# Patient Record
Sex: FEMALE | Race: WHITE
[De-identification: names, ages, dates, MRNs, and addresses within clinical notes are randomized per-mention and may not be internally consistent; named-entity substitution may affect disease eponyms.]

---

## 2017-05-05 ENCOUNTER — HOSPITAL ENCOUNTER (EMERGENCY)
Dept: HOSPITAL 61 - ER | Age: 37
Discharge: HOME | End: 2017-05-05
Payer: SELF-PAY

## 2017-05-05 VITALS — BODY MASS INDEX: 48.36 KG/M2 | HEIGHT: 64 IN | WEIGHT: 283.25 LBS

## 2017-05-05 VITALS — DIASTOLIC BLOOD PRESSURE: 111 MMHG | SYSTOLIC BLOOD PRESSURE: 210 MMHG

## 2017-05-05 DIAGNOSIS — S29.011A: Primary | ICD-10-CM
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
## 2017-05-05 DIAGNOSIS — I10: ICD-10-CM

## 2017-05-05 PROCEDURE — 99283 EMERGENCY DEPT VISIT LOW MDM: CPT

## 2017-05-05 NOTE — PHYS DOC
Past Medical History


Past Medical History:  No Pertinent History


Past Surgical History:  , Other


Additional Past Surgical Histo:  esophageal nos


Alcohol Use:  None


Drug Use:  None





Adult General


Chief Complaint


Chief Complaint:  BREAST PROBLEM





[facility]


HPI





Patient is a 37  year old female presents emergency Department with complaint 

of left chest wall pain for approximately 3 days. Patient states that she was 

wrestling with her cousin when she felt some pulling her left chest. She denies 

shortness of breath, radiation of the pain, nausea or vomiting.


Of incidental note, patient does have a history of hypertension. She states 

that she has not taken her blood pressure medicine in quite some time. She 

denies palpitations, exertional dyspnea, orthopnea or PND. She denies headache, 

visual disturbances or tinnitus. She denies any focal weakness, numbness or 

tingling.





Review of Systems


Review of Systems





Constitutional: Denies fever or chills []


Eyes: Denies change in visual acuity, redness, or eye pain []


HENT: Denies nasal congestion or sore throat []


Respiratory: Denies cough or shortness of breath []


Cardiovascular: No additional information not addressed in HPI []


GI: Denies abdominal pain, nausea, vomiting, bloody stools or diarrhea []


: Denies dysuria or hematuria []


Musculoskeletal: Denies back pain or joint pain []


Integument: Denies rash or skin lesions []


Neurologic: Denies headache, focal weakness or sensory changes []


Endocrine: Denies polyuria or polydipsia []





Allergies


Allergies





Allergies








Coded Allergies Type Severity Reaction Last Updated Verified


 


  No Known Drug Allergies    14 No











Physical Exam


Physical Exam





Constitutional: Well developed, well nourished, no acute distress, non-toxic 

appearance. []


HENT: Normocephalic, atraumatic, bilateral external ears normal, oropharynx 

moist, no oral exudates, nose normal. []


Eyes: PERRLA, EOMI, conjunctiva normal, no discharge. Patient has strabismus 

worse glasses. 


Neck: Normal range of motion, no tenderness, supple, no stridor. [] 


Cardiovascular:Heart rate regular rhythm, no murmur []


Lungs & Thorax: There is no evidence respiratory distress respiratory fatigue. 

There is no posturing or sensory muscle use. Lung sounds are clear to 

auscultation bilaterally. There is tenderness to palpation to the middle 

portion of the pectoralis muscle without palpable defect, deformity or spasm. 

There is no mass to the breast tissue amenable discharge or axillary 

lymphadenopathy.


Abdomen: Bowel sounds normal, soft, no tenderness, no masses, no pulsatile 

masses. [] 


Skin: Warm, dry, no erythema, no rash. [] 


Back: No tenderness, no CVA tenderness. [] 


Extremities: No tenderness, no cyanosis, no clubbing, ROM intact, no edema. [] 


Neurologic: Alert and oriented X 3, normal motor function, normal sensory 

function, no focal deficits noted. []


Psychologic: Affect normal, judgement normal, mood normal. []





EKG


EKG


[]





Radiology/Procedures


Radiology/Procedures


[]





Course & Med Decision Making


Course & Med Decision Making


Pertinent Labs and Imaging studies reviewed. (See chart for details)





[]





Dragon Disclaimer


Dragon Disclaimer


This electronic medical record was generated, in whole or in part, using a 

voice recognition dictation system.





Departure


Departure


Impression:  


 Primary Impression:  


 Strain of left pectoralis muscle


 Additional Impression:  


 Hypertension


Disposition:  01 HOME, SELF-CARE


Condition:  GOOD


Referrals:  


NON,STAFF (PCP)


Patient Instructions:  Hypertension, Easy-to-Read, Muscle Strain, Easy-to-Read





Additional Instructions:  


1. Take the medication as prescribed.


2. Review the discharge instructions provided for self-care and reasons to 

return to the emergency department.


3. It is very important for you to follow up with [facility] or another 

healthcare provider to address your blood pressure. It is obvious that you need 

to be placed back on blood pressure medication and take it daily.


Scripts


Hydrocodone/Apap 5-325 (NORCO 5-325 TABLET) 1 Each Tablet


1 TAB PO PRN Q6HRS Y for PAIN, #10 TAB 0 Refills


   Prov: ORLANDO CHAMPAGNE         17





Problem Qualifiers








 Primary Impression:  


 Strain of left pectoralis muscle


 Encounter type:  initial encounter  Qualified Codes:  S29.011A - Strain of 

muscle and tendon of front wall of thorax, initial encounter


 Additional Impression:  


 Hypertension


 Hypertension type:  essential hypertension  Qualified Codes:  I10 - Essential (

primary) hypertension








ORLANDO CHAMPAGNE May 5, 2017 18:44

## 2018-07-27 ENCOUNTER — HOSPITAL ENCOUNTER (EMERGENCY)
Dept: HOSPITAL 61 - ER | Age: 38
Discharge: HOME | End: 2018-07-27
Payer: SELF-PAY

## 2018-07-27 DIAGNOSIS — Z3A.10: ICD-10-CM

## 2018-07-27 DIAGNOSIS — E86.0: ICD-10-CM

## 2018-07-27 DIAGNOSIS — O23.41: Primary | ICD-10-CM

## 2018-07-27 DIAGNOSIS — O10.911: ICD-10-CM

## 2018-07-27 LAB
ADD MAN DIFF?: NO
AMORPHOUS SEDIMENT,UR: PRESENT /HPF
ANION GAP SERPL CALC-SCNC: 13 MMOL/L (ref 6–14)
ANISOCYTOSIS: SLIGHT
BACTERIA,URINE: (no result) /HPF
BASO #: 0.1 X10^3/UL (ref 0–0.2)
BASO %: 1 % (ref 0–3)
BILIRUBIN,URINE: (no result)
BLOOD UREA NITROGEN: 10 MG/DL (ref 7–20)
CALCIUM: 9.5 MG/DL (ref 8.5–10.1)
CHLORIDE: 104 MMOL/L (ref 98–107)
CLARITY,URINE: (no result)
CO2 SERPL-SCNC: 25 MMOL/L (ref 21–32)
COLOR,URINE: (no result)
CREAT SERPL-MCNC: 1.1 MG/DL (ref 0.6–1)
EOS #: 0.3 X10^3/UL (ref 0–0.7)
EOS %: 3 % (ref 0–3)
GFR SERPLBLD BASED ON 1.73 SQ M-ARVRAT: 55.9 ML/MIN
GLUCOSE SERPL-MCNC: 129 MG/DL (ref 70–99)
GLUCOSE,URINE: NEGATIVE MG/DL
HCG SERPL-ACNC: 9.9 X10^3/UL (ref 4–11)
HEMATOCRIT: 34.3 % (ref 36–47)
HEMOGLOBIN: 10.7 G/DL (ref 12–15.5)
HYPOCHROMIA: (no result)
LYMPH #: 2.6 X10^3/UL (ref 1–4.8)
LYMPH %: 26 % (ref 24–48)
MEAN CORPUSCULAR HEMOGLOBIN: 20 PG (ref 25–35)
MEAN CORPUSCULAR HGB CONC: 31 G/DL (ref 31–37)
MEAN CORPUSCULAR VOLUME: 64 FL (ref 79–100)
MICROCYTOSIS: (no result)
MONO #: 0.6 X10^3/UL (ref 0–1.1)
MONO %: 6 % (ref 0–9)
NEUT #: 6.3 X10^3UL (ref 1.8–7.7)
NEUT %: 64 % (ref 31–73)
NITRITE,URINE: NEGATIVE
OVALOCYTES: (no result)
PH,URINE: 5.5
PLATELET COUNT: 395 X10^3/UL (ref 140–400)
PLT ESTIMATE: ADEQUATE
POTASSIUM SERPL-SCNC: 3.3 MMOL/L (ref 3.5–5.1)
PROTEIN,URINE: 30 MG/DL
RBC,URINE: 0 /HPF (ref 0–2)
RED BLOOD COUNT: 5.37 X10^6/UL (ref 3.5–5.4)
RED CELL DISTRIBUTION WIDTH: 19.5 % (ref 11.5–14.5)
SODIUM: 142 MMOL/L (ref 136–145)
SPECIFIC GRAVITY,URINE: >=1.03
SQUAMOUS EPITHELIAL CELL,UR: (no result) /LPF
TEAR DROP CELLS: (no result)
UROBILINOGEN,URINE: 1 MG/DL
WBC,URINE: (no result) /HPF (ref 0–4)

## 2018-07-27 PROCEDURE — 84702 CHORIONIC GONADOTROPIN TEST: CPT

## 2018-07-27 PROCEDURE — 99284 EMERGENCY DEPT VISIT MOD MDM: CPT

## 2018-07-27 PROCEDURE — 80048 BASIC METABOLIC PNL TOTAL CA: CPT

## 2018-07-27 PROCEDURE — 85025 COMPLETE CBC W/AUTO DIFF WBC: CPT

## 2018-07-27 PROCEDURE — 96374 THER/PROPH/DIAG INJ IV PUSH: CPT

## 2018-07-27 PROCEDURE — 96361 HYDRATE IV INFUSION ADD-ON: CPT

## 2018-07-27 PROCEDURE — 36415 COLL VENOUS BLD VENIPUNCTURE: CPT

## 2018-07-27 PROCEDURE — 81001 URINALYSIS AUTO W/SCOPE: CPT

## 2018-07-27 RX ADMIN — BACITRACIN 1 MLS/HR: 5000 INJECTION, POWDER, FOR SOLUTION INTRAMUSCULAR at 19:45

## 2018-07-27 RX ADMIN — BACITRACIN 1 MLS/HR: 5000 INJECTION, POWDER, FOR SOLUTION INTRAMUSCULAR at 18:18

## 2018-07-27 RX ADMIN — ONDANSETRON 1 MG: 2 INJECTION INTRAMUSCULAR; INTRAVENOUS at 18:19

## 2020-02-25 VITALS — SYSTOLIC BLOOD PRESSURE: 139 MMHG | DIASTOLIC BLOOD PRESSURE: 61 MMHG

## 2021-06-09 ENCOUNTER — HOSPITAL ENCOUNTER (OUTPATIENT)
Dept: HOSPITAL 61 - RAD | Age: 41
End: 2021-06-09
Payer: COMMERCIAL

## 2021-06-09 DIAGNOSIS — M47.816: Primary | ICD-10-CM

## 2021-06-09 PROCEDURE — 72100 X-RAY EXAM L-S SPINE 2/3 VWS: CPT

## 2021-06-09 NOTE — RAD
EXAM: Lumbar spine, 2 views.



HISTORY: Pain.



COMPARISON: None.



FINDINGS: 2 views of the lumbar spine are obtained. There is a transitional lumbosacral segment, cons
idered a partially sacralized L6 segment for this dictation. There is multilevel endplate remodeling.
 There are multiple endplate Schmorl's nodes. There is facet arthropathy predominantly at the lower l
umbar levels. There is no fracture.



IMPRESSION: 

1. Multilevel degenerative change, described above.

2. No acute osseous finding. 



Electronically signed by: Diana Headley MD (6/9/2021 1:25 PM) [id number]

## 2021-11-22 ENCOUNTER — HOSPITAL ENCOUNTER (EMERGENCY)
Dept: HOSPITAL 61 - ER | Age: 41
Discharge: HOME | End: 2021-11-22
Payer: SELF-PAY

## 2021-11-22 VITALS — BODY MASS INDEX: 36.77 KG/M2 | WEIGHT: 215.39 LBS | HEIGHT: 64 IN

## 2021-11-22 VITALS — SYSTOLIC BLOOD PRESSURE: 170 MMHG | DIASTOLIC BLOOD PRESSURE: 76 MMHG
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
## 2021-11-22 DIAGNOSIS — S05.01XA: Primary | ICD-10-CM

## 2021-11-22 DIAGNOSIS — H10.31: ICD-10-CM

## 2021-11-22 DIAGNOSIS — Z88.6: ICD-10-CM
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
## 2021-11-22 DIAGNOSIS — I10: ICD-10-CM

## 2021-11-22 PROCEDURE — 90715 TDAP VACCINE 7 YRS/> IM: CPT

## 2021-11-22 PROCEDURE — 90471 IMMUNIZATION ADMIN: CPT

## 2021-11-22 PROCEDURE — 99283 EMERGENCY DEPT VISIT LOW MDM: CPT

## 2021-11-22 NOTE — PHYS DOC
Past Medical History


Past Medical History:  No Pertinent History, Hypertension


Additional Past Medical Histor:  NOT TAKING HTN MEDS FOR ALMOST A YEAR.  NO 

INSURANCE


Past Surgical History:  No Surgical History


Additional Past Surgical Histo:  esophageal nos


Smoking Status:  Never Smoker


Alcohol Use:  None


Drug Use:  None





General Adult


EDM:


Chief Complaint:  EYE PROBLEMS





HPI:


HPI:





Patient is a 41-year-old female presents to the emergency department complaining

of right eye pain since this morning for patient reports 2 days ago she noticed 

some mild itching to her right eye, she thinks she may have scratched it 

overnight when she woke up yesterday she started feeling pain and it was 

becoming difficult to see because she could not open of her eyelids.  Patient 

reports this morning her eye was matted shut with yellowish crusty material.  

Patient reports she took 600 mg ibuprofen for an 8 out of 10 pain scale.  

Patient states she does not hurt if she keeps her eyes closed.  Patient reports 

it feels as if there is sand in her eyes.  Patient denies visual changes or 

visual disturbances.  Patient denies wearing contacts, patient reports she wears

glasses and is partially blind of the left eye.  Patient reports having a lazy 

left eye.  Patient reports her last tetanus immunization was greater than 5 

years ago.  Patient denies other physical complaints or physical concerns.





Review of Systems:


Review of Systems:


14 body systems of review of systems have been reviewed.  See HPI for pertinent 

positives and negative responses, otherwise all other systems are negative, 

nonpertinent or noncontributory.


Constitutional: Negative except as outlined in HPI above.


Skin: Negative except as outlined in HPI above.


Eyes:   Negative except as outlined in HPI above.


HENT: Negative except as outlined in HPI above.


Respiratory:   Negative except as outlined in HPI above.


Cardiovascular:   Negative except as outlined in HPI above.


GI:   Negative except as outlined in HPI above.


:  Negative except as outlined in HPI above.


Musculoskeletal:   Negative except as outlined in HPI above.


Integument:   Negative except as outlined in HPI above.


Neurologic:   Negative except as outlined in HPI above.


Endocrine:   Negative except as outlined in HPI above.


Lymphatic:  Negative except as outlined in HPI above.


Psychiatric:  Negative except as outlined in HPI above.





Heart Score:


C/O Chest Pain:  No


Risk Factors:


Risk Factors:  DM, Current or recent (<one month) smoker, HTN, HLP, family 

history of CAD, obesity.


Risk Scores:


Score 0 - 3:  2.5% MACE over next 6 weeks - Discharge Home


Score 4 - 6:  20.3% MACE over next 6 weeks - Admit for Clinical Observation


Score 7 - 10:  72.7% MACE over next 6 weeks - Early Invasive Strategies





Current Medications:





Current Medications








 Medications


  (Trade)  Dose


 Ordered  Sig/Francy  Start Time


 Stop Time Status Last Admin


Dose Admin


 


 Acetaminophen/


 Hydrocodone Bitart


  (Lortab 5/325)  1 tab  1X  ONCE  11/22/21 18:15


 11/22/21 18:16 DC 11/22/21 18:16


1 TAB


 


 Diphtheria/


 Tetanus/Acell


 Pertussis


  (ADACEL TDap


 SYRINGE)  0.5 ml  ONCE ONCE  11/22/21 18:15


 11/22/21 18:16 DC 11/22/21 18:23


0.5 ML


 


 Erythromycin


  (Romycin)  0.5 inch  1X  ONCE  11/22/21 19:00


 11/22/21 19:01   





 


 Fluorescein Sodium


  (Ful-Cecily)  1 strip  1X  ONCE  11/22/21 18:15


 11/22/21 18:16 DC 11/22/21 18:15


1 STRIP


 


 Ibuprofen


  (Motrin)  600 mg  1X  ONCE  11/22/21 18:15


 11/22/21 18:16 DC 11/22/21 18:16


600 MG


 


 Tetracaine HCl


  (Tetracaine)  1 drop  1X  ONCE  11/22/21 18:15


 11/22/21 18:16 DC 11/22/21 18:15


1 DROP











Allergies:


Allergies:





Allergies








Coded Allergies Type Severity Reaction Last Updated Verified


 


  aspirin Allergy Mild  11/22/21 Yes











Physical Exam:


PE:





Constitutional: Well developed, well nourished, no acute distress, non-toxic 

appearance.  41-year-old female in no apparent distress.


HENT: Normocephalic, atraumatic.


Eyes: PERRLA, satisfactory 6 cardinal eye movements, no conjunctivitis or 

erythema of the left eye, the left eye does deviate laterally with patient's 

history of lazy eye.  Right eye upper and lower lid erythematous, light pink 

erythema of the conjunctive a surrounding iris, upper eyelid conjunctivitis, 

visual acuity OS 20/100, OD 20/70, OU 20/70.  Fluorescein eye exam of right eye 

positive fluorescein uptake small to millimeter in diameter area just above iris

at 12 o'clock position and scleral field, no Terrance sign, positive red light 

reflex, Isaiah-Pen test 17 and repeat test 16 of the right ocular globe.


Neck: Normal range of motion, no stridor.  No foreign bodies appreciated during 

my examination.  


Cardiovascular: No cyanosis appreciated, distal cap refill less than 2 seconds.


Lungs & Thorax: Patient is in no respiratory distress, no audible adventitious 

lung sounds appreciated.


Abdomen: Nontender, no abnormalities noted.


Skin: Warm, dry, no erythema, no rash.  


Back: No tenderness, no deformities.


Extremities: No tenderness, no cyanosis, no clubbing, ROM intact, no edema.  


Neurologic: Alert and oriented X 3, normal motor function, normal sensory 

function, no focal deficits noted. 


Psychologic: Affect normal, judgement normal, mood normal.





Current Patient Data:


Vital Signs:





                                   Vital Signs








  Date Time  Temp Pulse Resp B/P (MAP) Pulse Ox O2 Delivery O2 Flow Rate FiO2


 


11/22/21 18:16   18  100   


 


11/22/21 18:02 97.9   170/76 (107)  Room Air  





 97.9       











EKG:


EKG:


[]





Radiology/Procedures:


Radiology/Procedures:


[]





Course & Med Decision Making:


Course & Med Decision Making


Pertinent Labs and Imaging studies reviewed. (See chart for details)





41-year-old female, vital signs reviewed, presents emerged from concerning right

 eye pain for 2 days.  Physical examination consistent with conjunctivitis with 

corneal abrasion.  Conjunctivitis may have a viral component with patient's 

explanation of events however we will treat with erythromycin eye ointment one 

half ribbon 4 times a day for the next 5 days related to corneal abrasion, 

strict follow-up with ophthalmology call tomorrow for appointment.  Discussed 

with patient over-the-counter Tylenol and or Motrin for ongoing eye pain, eye 

antibiotic ointment use, side effects, follow-up with ophthalmology, patient 

gave verbal understanding of and is amenable to ED discharge planning.  Patient 

reports she has no eye pain at this time.  Patient's tetanus immunization was 

brought up-to-date today in the emergency department with Adacel/Tdap.





Discussed with the patient all findings and diagnostic testing as well as the 

need to follow-up with their primary care provider for further evaluation and 

treatment or return to the ED if any new or worsening symptoms.  Strict return 

precautions were also discussed at length, the patient voiced understanding and 

agreement with the discharge planning.  The patient was nontoxic in appearance, 

in no apparent distress, and hemodynamically stable at the time of disposition.





Dragon Disclaimer:


Dragon Disclaimer:


This electronic medical record was generated, in whole or in part, using a voice

 recognition dictation system.





Departure


Departure


Impression:  


   Primary Impression:  


   Conjunctivitis, right eye


   Qualified Codes:  H10.31 - Unspecified acute conjunctivitis, right eye


   Additional Impression:  


   Corneal abrasion, right


   Qualified Codes:  S05.01XA - Injury of conjunctiva and corneal abrasion 

   without foreign body, right eye, initial encounter


Disposition:  01 HOME / SELF CARE / HOMELESS


Condition:  GOOD


Referrals:  


NO PCP (PCP)








INA DANG MD


Patient Instructions:  Conjunctivitis (Viral and Bacterial), Eye - Corneal 

Abrasion





Additional Instructions:  


You were seen today in the emergency department for right eye pain for the past 

2 days.  Your right eye is infected, this may be a viral infection however 

during your eye exam there was a small corneal abrasion or scratch on the 

surface of your eye noted.  This would explain your eye discomfort when you 

blink your eyes.  As we discussed, instill the antibiotic ointment into your 

lower right eyelid 4 times a day while awake for the next 5 days.  You may use 

over-the-counter Tylenol and or Motrin for pain.  Please call tomorrow for an 

appointment to see an ophthalmologist.  Return to the emergency department for 

worsening symptoms or other concerns.  Thank you for visiting our Emergency 

Department.  It was a pleasure taking care of you today in the emergency 

department and we appreciate you trusting us with your care. If any additional 

problems come up don't hesitate to return to visit us. Please follow up with 

your primary care provider so they can plan additional care if needed and know 

about the problem that you had. If symptoms worsen come back to the Emergency 

Department. Any concerning symptoms that start such as chest pain, shortness of 

air, weakness or numbness on one side of the body, running high fevers or any 

other concerning symptoms return to the ER.











EMERGENCY DEPARTMENT GENERAL DISCHARGE INSTRUCTIONS





Thank you for coming to [facility] Emergency Department (ED) 

today and 


trusting us with you care.  We trust that you had a positive experience in our 

Emergency 


Department.  If you wish to speak to the department management, you may call the

 Director at 


[phone].





YOUR FOLLOW UP INSTRUCTIONS ARE AS FOLLOWS:





1.  Do you have a private Doctor?  If you do not have a private doctor, please 

ask for a 


resource list of physicians or clinics that may be able to assist you with 

follow up care.





2.  The Emergency Physicain has interpreted your x-rays.  The X-Ray specialist 

will also 


review them.  If there is a change in the findings, you will be notified in 48 

hours when at 


all possible.





3.  A lab test or culture has been done, your results will be reviewed and you 

will be 


notified if you need a change in treatment.





ADDITIONAL INSTRUCTIONS AND INFORMATION:





1.  Your care today has been supervised by a physician who is specially trained 

in emergency 


care.  Many problems require more than one evaluation for a complete diagnosis 

and 


treatment.  We recommend that you schedule your follow up appointment as 

recommended to 


ensure complete treatment of you illness or injury.  If you are unable to obtain

 follow up 


care and continue to have a problem, or if your condition worsens, we recommend 

that you 


return to the ED.





2.  We are not able to safely determine your condition over the phone nor are we

 able to 


give sound medical advice over the phone.  For these safety reasons, if you call

 for medical 


advice we will ask you to come to the ED for further evaluation.





3.  If you have any questions regarding these discharge instructions please call

 the ED at 


[phone].





SAFETY INFORMATION:





In the interest of safety, wellness, and injury prevention; we encourage you to 

wear your 


sealbelt, if you smoke; quite smoking, and we encourage family to use a 

protective helmet 


for bicycling and other sporting events that present an increased risk for head 

injury.





IF YOUR SYMPTOMS WORSEN OR NEW SYMPTOMS DEVELOP, OR YOU HAVE CONCERNS ABOUT YOUR

 CONDITION; 


OR IF YOUR CONDITION WORSENS WHILE YOU ARE WAITING FOR YOUR FOLLOW UP 

APPOINTMENT; EITHER 


CONTACT YOUR PRIMARY CARE DOCTOR, THE PHYSICIAN WHOSE NAME AND NUMBER YOU WERE 

GIVEN, OR 


RETURN TO THE ED IMMEDIATELY.


Scripts


Erythromycin Base (Erythromycin) 1 Gm Oint...g.


1 GM OD QID for corneal abrasion, #1 MISC 0 Refills


   Instill 1/2 inch ribbon to the inner part of your right


   eyelid 4 times a day for the next 5 days.


   Prov: NARINDER HERNANDEZ         11/22/21











NARINDER HERNANDEZ       Nov 22, 2021 19:07